# Patient Record
Sex: FEMALE | Race: WHITE | NOT HISPANIC OR LATINO | URBAN - METROPOLITAN AREA
[De-identification: names, ages, dates, MRNs, and addresses within clinical notes are randomized per-mention and may not be internally consistent; named-entity substitution may affect disease eponyms.]

---

## 2017-01-12 NOTE — PATIENT DISCUSSION
Continue: Systane Balance (propylene glycol): drops: 0.6% 1 drop every morning as needed into both eyes

## 2017-01-12 NOTE — PATIENT DISCUSSION
POSTERIOR VITREOUS DETACHMENT, OU:  NO HOLES. NO TEARS. RETINAL  DETACHMENT WARNINGS DISCUSSED. RETURN FOR FOLLOW-UP AS SCHEDULED.

## 2019-03-15 ENCOUNTER — IMPORTED ENCOUNTER (OUTPATIENT)
Dept: URBAN - METROPOLITAN AREA CLINIC 50 | Facility: CLINIC | Age: 69
End: 2019-03-15

## 2019-12-07 NOTE — PATIENT DISCUSSION
Best Corrected - Daily wearOD-3.50+4.00156+[id number]/30 +&nbsp;SN &nbsp; &nbsp; clk Daily or almost daily

## 2020-05-27 ENCOUNTER — IMPORTED ENCOUNTER (OUTPATIENT)
Dept: URBAN - METROPOLITAN AREA CLINIC 50 | Facility: CLINIC | Age: 70
End: 2020-05-27

## 2021-04-17 ASSESSMENT — VISUAL ACUITY
OS_CC: J1+
OD_BAT: 20/25
OS_CC: 20/20-1
OS_BAT: 20/25
OS_OTHER: 20/25. 20/30.
OD_CC: 20/20
OS_CC: 20/20-1
OD_CC: J1+@ 16 IN
OD_CC: 20/20-1
OD_CC: J1+
OD_OTHER: 20/25. 20/40.
OS_CC: J1+@ 16 IN

## 2021-04-17 ASSESSMENT — TONOMETRY
OS_IOP_MMHG: 13
OD_IOP_MMHG: 12
OD_IOP_MMHG: 12
OS_IOP_MMHG: 12

## 2021-05-24 ENCOUNTER — PREPPED CHART (OUTPATIENT)
Dept: URBAN - METROPOLITAN AREA CLINIC 53 | Facility: CLINIC | Age: 71
End: 2021-05-24

## 2021-05-25 ENCOUNTER — COMPREHENSIVE EXAM (OUTPATIENT)
Dept: URBAN - METROPOLITAN AREA CLINIC 53 | Facility: CLINIC | Age: 71
End: 2021-05-25

## 2021-05-25 DIAGNOSIS — H35.363: ICD-10-CM

## 2021-05-25 DIAGNOSIS — H43.813: ICD-10-CM

## 2021-05-25 DIAGNOSIS — H25.13: ICD-10-CM

## 2021-05-25 PROCEDURE — 92134 CPTRZ OPH DX IMG PST SGM RTA: CPT

## 2021-05-25 PROCEDURE — 92014 COMPRE OPH EXAM EST PT 1/>: CPT

## 2021-05-25 ASSESSMENT — VISUAL ACUITY
OD_GLARE: 20/20
OU_CC: J1+
OS_CC: 20/20-2
OS_GLARE: 20/20
OD_GLARE: 20/25
OD_CC: 20/20
OS_GLARE: 20/20

## 2021-05-25 ASSESSMENT — TONOMETRY
OS_IOP_MMHG: 13
OD_IOP_MMHG: 13

## 2022-09-06 ENCOUNTER — COMPREHENSIVE EXAM (OUTPATIENT)
Dept: URBAN - METROPOLITAN AREA CLINIC 53 | Facility: CLINIC | Age: 72
End: 2022-09-06

## 2022-09-06 DIAGNOSIS — H35.363: ICD-10-CM

## 2022-09-06 DIAGNOSIS — H25.13: ICD-10-CM

## 2022-09-06 DIAGNOSIS — H43.813: ICD-10-CM

## 2022-09-06 PROCEDURE — 92014 COMPRE OPH EXAM EST PT 1/>: CPT

## 2022-09-06 ASSESSMENT — TONOMETRY
OS_IOP_MMHG: 14
OD_IOP_MMHG: 14

## 2022-09-06 ASSESSMENT — VISUAL ACUITY
OU_CC: 20/20
OU_CC: J1+
OS_CC: 20/20
OD_CC: 20/20

## 2023-05-10 ENCOUNTER — ESTABLISHED PATIENT (OUTPATIENT)
Dept: URBAN - METROPOLITAN AREA CLINIC 49 | Facility: CLINIC | Age: 73
End: 2023-05-10

## 2023-05-10 DIAGNOSIS — H00.11: ICD-10-CM

## 2023-05-10 PROCEDURE — 92012 INTRM OPH EXAM EST PATIENT: CPT

## 2023-05-10 RX ORDER — TOBRAMYCIN AND DEXAMETHASONE 1; 3 MG/ML; MG/ML: 1 SUSPENSION/ DROPS OPHTHALMIC

## 2023-05-10 ASSESSMENT — VISUAL ACUITY
OS_GLARE: 20/25
OS_CC: 20/20
OD_CC: 20/20-2
OD_GLARE: 20/25

## 2023-05-10 ASSESSMENT — TONOMETRY
OS_IOP_MMHG: 14
OD_IOP_MMHG: 14

## 2023-06-09 ENCOUNTER — ESTABLISHED PATIENT (OUTPATIENT)
Dept: URBAN - METROPOLITAN AREA CLINIC 49 | Facility: CLINIC | Age: 73
End: 2023-06-09

## 2023-06-09 DIAGNOSIS — H04.123: ICD-10-CM

## 2023-06-09 DIAGNOSIS — G43.B0: ICD-10-CM

## 2023-06-09 PROCEDURE — 92012 INTRM OPH EXAM EST PATIENT: CPT

## 2023-06-09 RX ORDER — CARBOXYMETHYLCELLULOSE SODIUM 10 MG/ML
1 GEL OPHTHALMIC EVERY EVENING
Start: 2023-06-09

## 2023-06-09 ASSESSMENT — VISUAL ACUITY
OD_CC: 20/20-2
OS_CC: 20/20-2

## 2023-06-09 ASSESSMENT — TONOMETRY
OS_IOP_MMHG: 17
OD_IOP_MMHG: 17

## 2023-09-26 ENCOUNTER — COMPREHENSIVE EXAM (OUTPATIENT)
Dept: URBAN - METROPOLITAN AREA CLINIC 53 | Facility: CLINIC | Age: 73
End: 2023-09-26

## 2023-09-26 DIAGNOSIS — H43.813: ICD-10-CM

## 2023-09-26 DIAGNOSIS — H04.123: ICD-10-CM

## 2023-09-26 DIAGNOSIS — H35.363: ICD-10-CM

## 2023-09-26 DIAGNOSIS — H25.13: ICD-10-CM

## 2023-09-26 PROCEDURE — 92015 DETERMINE REFRACTIVE STATE: CPT

## 2023-09-26 PROCEDURE — 92134 CPTRZ OPH DX IMG PST SGM RTA: CPT

## 2023-09-26 PROCEDURE — 99214 OFFICE O/P EST MOD 30 MIN: CPT

## 2023-09-26 ASSESSMENT — VISUAL ACUITY
OD_GLARE: 20/25
OD_GLARE: 20/20
OS_CC: 20/25-2
OD_CC: 20/25 PUSH
OS_GLARE: 20/20
OS_GLARE: 20/20

## 2023-09-26 ASSESSMENT — TONOMETRY
OD_IOP_MMHG: 14
OS_IOP_MMHG: 12

## 2023-09-26 ASSESSMENT — KERATOMETRY
OS_K2POWER_DIOPTERS: 46.25
OS_AXISANGLE_DEGREES: 058
OD_AXISANGLE2_DEGREES: 22
OD_K1POWER_DIOPTERS: 45.25
OS_K1POWER_DIOPTERS: 45.25
OD_AXISANGLE_DEGREES: 112
OS_AXISANGLE2_DEGREES: 148
OD_K2POWER_DIOPTERS: 45.75

## 2024-01-10 ENCOUNTER — ESTABLISHED PATIENT (OUTPATIENT)
Dept: URBAN - METROPOLITAN AREA CLINIC 53 | Facility: CLINIC | Age: 74
End: 2024-01-10

## 2024-01-10 DIAGNOSIS — H00.021: ICD-10-CM

## 2024-01-10 PROCEDURE — 99213 OFFICE O/P EST LOW 20 MIN: CPT

## 2024-01-10 RX ORDER — CEPHALEXIN 500 MG/1: 1 TABLET ORAL TWICE A DAY

## 2024-01-10 ASSESSMENT — VISUAL ACUITY
OD_SC: 20/25-2
OS_SC: 20/25

## 2024-01-10 ASSESSMENT — KERATOMETRY
OD_K2POWER_DIOPTERS: 45.75
OS_K1POWER_DIOPTERS: 45.25
OS_AXISANGLE_DEGREES: 058
OD_AXISANGLE2_DEGREES: 22
OD_AXISANGLE_DEGREES: 112
OS_AXISANGLE2_DEGREES: 148
OD_K1POWER_DIOPTERS: 45.25
OS_K2POWER_DIOPTERS: 46.25

## 2024-01-10 ASSESSMENT — TONOMETRY
OS_IOP_MMHG: 18
OD_IOP_MMHG: 16

## 2024-03-04 ENCOUNTER — ESTABLISHED PATIENT (OUTPATIENT)
Dept: URBAN - METROPOLITAN AREA CLINIC 53 | Facility: CLINIC | Age: 74
End: 2024-03-04

## 2024-03-04 DIAGNOSIS — H04.123: ICD-10-CM

## 2024-03-04 DIAGNOSIS — H25.13: ICD-10-CM

## 2024-03-04 DIAGNOSIS — H00.021: ICD-10-CM

## 2024-03-04 PROCEDURE — 99213 OFFICE O/P EST LOW 20 MIN: CPT

## 2024-03-04 RX ORDER — CEPHALEXIN 500 MG/1: 1 CAPSULE ORAL TWICE A DAY

## 2024-03-04 ASSESSMENT — TONOMETRY
OD_IOP_MMHG: 17
OS_IOP_MMHG: 18

## 2024-03-04 ASSESSMENT — VISUAL ACUITY
OS_CC: 20/20
OD_CC: 20/20-2

## 2024-04-12 ENCOUNTER — ESTABLISHED PATIENT (OUTPATIENT)
Dept: URBAN - METROPOLITAN AREA CLINIC 53 | Facility: CLINIC | Age: 74
End: 2024-04-12

## 2024-04-12 DIAGNOSIS — H04.123: ICD-10-CM

## 2024-04-12 PROCEDURE — 99213 OFFICE O/P EST LOW 20 MIN: CPT

## 2024-04-12 RX ORDER — LIFITEGRAST 50 MG/ML: 1 SOLUTION/ DROPS OPHTHALMIC TWICE A DAY

## 2024-04-12 ASSESSMENT — VISUAL ACUITY
OD_CC: 20/20-2
OS_CC: 20/20

## 2024-04-12 ASSESSMENT — TONOMETRY
OD_IOP_MMHG: 17
OS_IOP_MMHG: 18

## 2024-11-14 ENCOUNTER — COMPREHENSIVE EXAM (OUTPATIENT)
Dept: URBAN - METROPOLITAN AREA CLINIC 49 | Facility: LOCATION | Age: 74
End: 2024-11-14

## 2024-11-14 DIAGNOSIS — H43.823: ICD-10-CM

## 2024-11-14 DIAGNOSIS — H43.813: ICD-10-CM

## 2024-11-14 DIAGNOSIS — H04.123: ICD-10-CM

## 2024-11-14 DIAGNOSIS — H52.4: ICD-10-CM

## 2024-11-14 DIAGNOSIS — H25.13: ICD-10-CM

## 2024-11-14 DIAGNOSIS — H35.363: ICD-10-CM

## 2024-11-14 PROCEDURE — 99214 OFFICE O/P EST MOD 30 MIN: CPT

## 2024-11-14 PROCEDURE — 92134 CPTRZ OPH DX IMG PST SGM RTA: CPT

## 2024-11-14 PROCEDURE — 92015 DETERMINE REFRACTIVE STATE: CPT
